# Patient Record
Sex: FEMALE | Race: BLACK OR AFRICAN AMERICAN | Employment: OTHER | URBAN - METROPOLITAN AREA
[De-identification: names, ages, dates, MRNs, and addresses within clinical notes are randomized per-mention and may not be internally consistent; named-entity substitution may affect disease eponyms.]

---

## 2022-01-16 ENCOUNTER — HOSPITAL ENCOUNTER (EMERGENCY)
Facility: HOSPITAL | Age: 37
Discharge: HOME | End: 2022-01-16
Attending: EMERGENCY MEDICINE
Payer: COMMERCIAL

## 2022-01-16 VITALS
TEMPERATURE: 99.2 F | OXYGEN SATURATION: 99 % | HEIGHT: 63 IN | RESPIRATION RATE: 20 BRPM | DIASTOLIC BLOOD PRESSURE: 80 MMHG | HEART RATE: 101 BPM | SYSTOLIC BLOOD PRESSURE: 148 MMHG | WEIGHT: 125 LBS | BODY MASS INDEX: 22.15 KG/M2

## 2022-01-16 DIAGNOSIS — R30.0 DYSURIA: ICD-10-CM

## 2022-01-16 DIAGNOSIS — Z20.822 PERSON UNDER INVESTIGATION FOR COVID-19: Primary | ICD-10-CM

## 2022-01-16 LAB
BILIRUB UR QL STRIP: NEGATIVE
CLARITY UR: ABNORMAL
COLOR UR: YELLOW
GLUCOSE UR STRIP-MCNC: NEGATIVE MG/DL
HGB UR QL STRIP.AUTO: NEGATIVE
KETONES UR STRIP-MCNC: ABNORMAL MG/DL
LEUKOCYTE ESTERASE UR QL STRIP: NEGATIVE
NITRITE UR QL STRIP: NEGATIVE
PH UR STRIP.AUTO: 6 [PH]
PROT UR STRIP-MCNC: NEGATIVE MG/DL
SP GR UR STRIP.AUTO: >=1.03 (ref 1–1.03)
UROBILINOGEN UR QL STRIP.AUTO: 0.2 E.U./DL

## 2022-01-16 PROCEDURE — 81003 URINALYSIS AUTO W/O SCOPE: CPT | Performed by: PHYSICIAN ASSISTANT

## 2022-01-16 PROCEDURE — 99284 EMERGENCY DEPT VISIT MOD MDM: CPT | Performed by: PHYSICIAN ASSISTANT

## 2022-01-16 PROCEDURE — 99283 EMERGENCY DEPT VISIT LOW MDM: CPT

## 2022-01-16 PROCEDURE — 87636 SARSCOV2 & INF A&B AMP PRB: CPT | Performed by: PHYSICIAN ASSISTANT

## 2022-01-16 RX ORDER — ACETAMINOPHEN 325 MG/1
975 TABLET ORAL ONCE
Status: DISCONTINUED | OUTPATIENT
Start: 2022-01-16 | End: 2022-01-16 | Stop reason: HOSPADM

## 2022-01-16 RX ORDER — BUDESONIDE 180 UG/1
2 AEROSOL, POWDER RESPIRATORY (INHALATION) 2 TIMES DAILY
Qty: 1 EACH | Refills: 0 | Status: SHIPPED | OUTPATIENT
Start: 2022-01-16

## 2022-01-16 RX ORDER — ACETAMINOPHEN 500 MG
1000 TABLET ORAL EVERY 6 HOURS PRN
Qty: 60 TABLET | Refills: 0 | Status: SHIPPED | OUTPATIENT
Start: 2022-01-16

## 2022-01-18 LAB
FLUAV RNA RESP QL NAA+PROBE: NEGATIVE
FLUBV RNA RESP QL NAA+PROBE: NEGATIVE
SARS-COV-2 RNA RESP QL NAA+PROBE: POSITIVE

## 2022-01-18 NOTE — RESULT ENCOUNTER NOTE
I spoke with Jaclyn Esteban and let her know that her COVID-19 swab was positive  Continue symptomatic treatment  Advised she implement home isolation measures including      Staying home  Stay in a specific "sick room" or area and away from other people or animals, including pets  Wear a mask when leaving your room  Use a separate bathroom, if available  Wipe down all commonly touched surfaces with household

## 2022-01-18 NOTE — RESULT ENCOUNTER NOTE
Attempted to contact patient regarding +COVID results -pt unable to talk at the time -provided [facility] ED number to callback for results

## 2022-01-19 NOTE — ED PROVIDER NOTES
History  Chief Complaint   Patient presents with    Chills     bodyaches, headache     40-year-old female presents to the emergency department seeking evaluation for generalized body aches pains headache subjective fevers chills and viral URI type symptoms  Patient reportedly lives in a house with several other housemates  She is accompanied by 2 other housemates who share similar symptoms  This patient also complains of dysuria associated with her generalized body aches and pains  Overall she is well-appearing in no acute distress  She is unvaccinated for COVID  Allergies reviewed          None       History reviewed  No pertinent past medical history  History reviewed  No pertinent surgical history  History reviewed  No pertinent family history  I have reviewed and agree with the history as documented  E-Cigarette/Vaping     E-Cigarette/Vaping Substances     Social History     Tobacco Use    Smoking status: Heavy Tobacco Smoker    Smokeless tobacco: Former User   Substance Use Topics    Alcohol use: Yes    Drug use: Yes     Types: Marijuana       Review of Systems   Constitutional: Positive for chills, fatigue and fever  HENT: Negative for congestion, ear pain, rhinorrhea, sinus pressure, sneezing, sore throat and trouble swallowing  Eyes: Negative for discharge and itching  Respiratory: Negative for cough, chest tightness, shortness of breath, wheezing and stridor  Cardiovascular: Negative for chest pain and palpitations  Gastrointestinal: Negative for abdominal pain, diarrhea, nausea and vomiting  Genitourinary: Positive for dysuria  Negative for vaginal discharge  Musculoskeletal: Positive for myalgias  Neurological: Negative for dizziness, syncope, numbness and headaches  All other systems reviewed and are negative  Physical Exam  Physical Exam  Vitals and nursing note reviewed  Constitutional:       General: She is not in acute distress       Appearance: Normal appearance  She is well-developed  She is not ill-appearing or diaphoretic  HENT:      Head: Normocephalic and atraumatic  Right Ear: External ear normal       Left Ear: External ear normal       Nose: Nose normal    Eyes:      Conjunctiva/sclera: Conjunctivae normal       Pupils: Pupils are equal, round, and reactive to light  Cardiovascular:      Rate and Rhythm: Normal rate and regular rhythm  Heart sounds: Normal heart sounds  No murmur heard  No friction rub  No gallop  Pulmonary:      Effort: Pulmonary effort is normal  No respiratory distress  Breath sounds: Normal breath sounds  No stridor  No wheezing or rales  Abdominal:      General: Bowel sounds are normal  There is no distension  Palpations: Abdomen is soft  Tenderness: There is no abdominal tenderness  There is no guarding  Musculoskeletal:         General: No tenderness  Normal range of motion  Cervical back: Normal range of motion  Skin:     General: Skin is warm  Capillary Refill: Capillary refill takes less than 2 seconds  Neurological:      Mental Status: She is alert and oriented to person, place, and time           Vital Signs  ED Triage Vitals [01/16/22 1443]   Temperature Pulse Respirations Blood Pressure SpO2   99 2 °F (37 3 °C) 101 20 148/80 99 %      Temp Source Heart Rate Source Patient Position - Orthostatic VS BP Location FiO2 (%)   Tympanic Monitor -- Left arm --      Pain Score       10 - Worst Possible Pain           Vitals:    01/16/22 1443   BP: 148/80   Pulse: 101         Visual Acuity      ED Medications  Medications - No data to display    Diagnostic Studies  Results Reviewed     Procedure Component Value Units Date/Time    COVID/FLU - 24 hour TAT [[id number]]  (Abnormal) Collected: 01/16/22 1813    Lab Status: Final result Specimen: Nares from Nose Updated: 01/18/22 0720     SARS-CoV-2 Positive     INFLUENZA A PCR Negative     INFLUENZA B PCR Negative    Narrative:      FOR PEDIATRIC PATIENTS - copy/paste COVID Guidelines URL to browser: [email or web address]    SARS-CoV-2 assay is a Nucleic Acid Amplification assay intended for the  qualitative detection of nucleic acid from SARS-CoV-2 in nasopharyngeal  swabs  Results are for the presumptive identification of SARS-CoV-2 RNA  Positive results are indicative of infection with SARS-CoV-2, the virus  causing COVID-19, but do not rule out bacterial infection or co-infection  with other viruses  Laboratories within the [place] and its  territories are required to report all positive results to the appropriate  public health authorities  Negative results do not preclude SARS-CoV-2  infection and should not be used as the sole basis for treatment or other  patient management decisions  Negative results must be combined with  clinical observations, patient history, and epidemiological information  This test has not been FDA cleared or approved  This test has been authorized by FDA under an Emergency Use Authorization  (EUA)  This test is only authorized for the duration of time the  declaration that circumstances exist justifying the authorization of the  emergency use of an in vitro diagnostic tests for detection of SARS-CoV-2  virus and/or diagnosis of COVID-19 infection under section 564(b)(1) of  the Act, 21 U  S C  190KAH-7(K)(5), unless the authorization is terminated  or revoked sooner  The test has been validated but independent review by FDA  and CLIA is pending  Test performed using the Roche andrew 6800 System: This RT-PCR assay  targets ORF1, a region unique to SARS-CoV-2   A conserved region in the  E-gene was chosen for pan-Sarbecovirus detection which includes  SARS-CoV-2       UA (URINE) with reflex to Scope [[id number]]  (Abnormal) Collected: 01/16/22 1811    Lab Status: Final result Specimen: Urine, Clean Catch Updated: 01/16/22 1911     Color, UA Yellow Clarity, UA Slightly Cloudy     Specific Gravity, UA >=1 030     pH, UA 6 0     Leukocytes, UA Negative     Nitrite, UA Negative     Protein, UA Negative mg/dl      Glucose, UA Negative mg/dl      Ketones, UA 15 (1+) mg/dl      Urobilinogen, UA 0 2 E U /dl      Bilirubin, UA Negative     Blood, UA Negative                 No orders to display              Procedures  Procedures         ED Course                                             MDM  Number of Diagnoses or Management Options  Dysuria  Person under investigation for COVID-19  Diagnosis management comments: Generally benign physical examination  Symptoms concerning for COVID-19  Recommend supportive care at home  Patient educated regarding COVID-23  Patient educated regarding their diagnosis and given return and follow-up instructions  Patient was advised to returned to the ED with worsening symptoms or concerns  Patient is understanding of and in agreement with the treatment plan  There are no questions at the time of discharge  Amount and/or Complexity of Data Reviewed  Clinical lab tests: ordered    Risk of Complications, Morbidity, and/or Mortality  Presenting problems: low  Diagnostic procedures: low  Management options: low    Patient Progress  Patient progress: stable      Disposition  Final diagnoses:   Person under investigation for COVID-19   Dysuria     Time reflects when diagnosis was documented in both MDM as applicable and the Disposition within this note     Time User Action Codes Description Comment    1/16/2022  3:58 PM London Guillermo Add [Z20 822] Person under investigation for COVID-19     1/16/2022  3:58 PM London Mccanner Add [R30 0] Dysuria       ED Disposition     ED Disposition Condition Date/Time Comment    Discharge Stable Sun Jan 16, 2022  3:58 PM Lennox Lozoya discharge to home/self care              Follow-up Information    None         Discharge Medication List as of 1/16/2022  7:33 PM      START taking these medications    Details   acetaminophen (TYLENOL) 500 mg tablet Take 2 tablets (1,000 mg total) by mouth every 6 (six) hours as needed for mild pain, Starting Sun 1/16/2022, Print      budesonide (Pulmicort Flexhaler) 180 MCG/ACT inhaler Inhale 2 puffs 2 (two) times a day Rinse mouth after use , Starting Sun 1/16/2022, Print             No discharge procedures on file      PDMP Review     None          ED Provider  Electronically Signed by           Elba Alves PA-C  01/19/22 0126

## 2023-04-04 ENCOUNTER — APPOINTMENT (EMERGENCY)
Dept: CT IMAGING | Facility: HOSPITAL | Age: 38
End: 2023-04-04

## 2023-04-04 ENCOUNTER — HOSPITAL ENCOUNTER (EMERGENCY)
Facility: HOSPITAL | Age: 38
Discharge: HOME | End: 2023-04-04
Attending: EMERGENCY MEDICINE

## 2023-04-04 VITALS
OXYGEN SATURATION: 99 % | HEART RATE: 75 BPM | DIASTOLIC BLOOD PRESSURE: 56 MMHG | RESPIRATION RATE: 16 BRPM | BODY MASS INDEX: 22.32 KG/M2 | TEMPERATURE: 98.7 F | HEIGHT: 63 IN | SYSTOLIC BLOOD PRESSURE: 110 MMHG | WEIGHT: 126 LBS

## 2023-04-04 DIAGNOSIS — K60.2 ANAL FISSURE: ICD-10-CM

## 2023-04-04 DIAGNOSIS — D64.9 ANEMIA: ICD-10-CM

## 2023-04-04 DIAGNOSIS — K62.89 RECTAL PAIN: Primary | ICD-10-CM

## 2023-04-04 DIAGNOSIS — D25.9 UTERINE MYOMA: ICD-10-CM

## 2023-04-04 LAB
ALBUMIN SERPL BCP-MCNC: 3.7 G/DL (ref 3.5–5)
ALP SERPL-CCNC: 48 U/L (ref 34–104)
ALT SERPL W P-5'-P-CCNC: 14 U/L (ref 7–52)
ANION GAP SERPL CALCULATED.3IONS-SCNC: 10 MMOL/L (ref 4–13)
AST SERPL W P-5'-P-CCNC: 46 U/L (ref 13–39)
BASOPHILS # BLD AUTO: 0.03 THOUSANDS/ÂΜL (ref 0–0.1)
BASOPHILS NFR BLD AUTO: 0 % (ref 0–1)
BILIRUB SERPL-MCNC: 0.32 MG/DL (ref 0.2–1)
BILIRUB UR QL STRIP: NEGATIVE
BUN SERPL-MCNC: 8 MG/DL (ref 5–25)
CALCIUM SERPL-MCNC: 8.3 MG/DL (ref 8.4–10.2)
CHLORIDE SERPL-SCNC: 104 MMOL/L (ref 96–108)
CLARITY UR: CLEAR
CO2 SERPL-SCNC: 23 MMOL/L (ref 21–32)
COLOR UR: ABNORMAL
CREAT SERPL-MCNC: 0.61 MG/DL (ref 0.6–1.3)
EOSINOPHIL # BLD AUTO: 0.06 THOUSAND/ÂΜL (ref 0–0.61)
EOSINOPHIL NFR BLD AUTO: 1 % (ref 0–6)
ERYTHROCYTE [DISTWIDTH] IN BLOOD BY AUTOMATED COUNT: 18.7 % (ref 11.6–15.1)
EXT PREGNANCY TEST URINE: NEGATIVE
EXT. CONTROL: NORMAL
GFR SERPL CREATININE-BSD FRML MDRD: 116 ML/MIN/1.73SQ M
GLUCOSE SERPL-MCNC: 80 MG/DL (ref 65–140)
GLUCOSE UR STRIP-MCNC: NEGATIVE MG/DL
HCT VFR BLD AUTO: 30.1 % (ref 34.8–46.1)
HGB BLD-MCNC: 9.7 G/DL (ref 11.5–15.4)
HGB UR QL STRIP.AUTO: NEGATIVE
IMM GRANULOCYTES # BLD AUTO: 0.02 THOUSAND/UL (ref 0–0.2)
IMM GRANULOCYTES NFR BLD AUTO: 0 % (ref 0–2)
KETONES UR STRIP-MCNC: NEGATIVE MG/DL
LEUKOCYTE ESTERASE UR QL STRIP: NEGATIVE
LIPASE SERPL-CCNC: 9 U/L (ref 11–82)
LYMPHOCYTES # BLD AUTO: 2.94 THOUSANDS/ÂΜL (ref 0.6–4.47)
LYMPHOCYTES NFR BLD AUTO: 35 % (ref 14–44)
MAGNESIUM SERPL-MCNC: 2 MG/DL (ref 1.9–2.7)
MCH RBC QN AUTO: 31.6 PG (ref 26.8–34.3)
MCHC RBC AUTO-ENTMCNC: 32.2 G/DL (ref 31.4–37.4)
MCV RBC AUTO: 98 FL (ref 82–98)
MONOCYTES # BLD AUTO: 0.52 THOUSAND/ÂΜL (ref 0.17–1.22)
MONOCYTES NFR BLD AUTO: 6 % (ref 4–12)
NEUTROPHILS # BLD AUTO: 4.92 THOUSANDS/ÂΜL (ref 1.85–7.62)
NEUTS SEG NFR BLD AUTO: 58 % (ref 43–75)
NITRITE UR QL STRIP: NEGATIVE
NRBC BLD AUTO-RTO: 0 /100 WBCS
PH UR STRIP.AUTO: 6 [PH]
PLATELET # BLD AUTO: 360 THOUSANDS/UL (ref 149–390)
PMV BLD AUTO: 9.3 FL (ref 8.9–12.7)
POTASSIUM SERPL-SCNC: 4.9 MMOL/L (ref 3.5–5.3)
PROT SERPL-MCNC: 7.6 G/DL (ref 6.4–8.4)
PROT UR STRIP-MCNC: NEGATIVE MG/DL
RBC # BLD AUTO: 3.07 MILLION/UL (ref 3.81–5.12)
SODIUM SERPL-SCNC: 137 MMOL/L (ref 135–147)
SP GR UR STRIP.AUTO: <=1.005
UROBILINOGEN UR QL STRIP.AUTO: 0.2 E.U./DL
WBC # BLD AUTO: 8.49 THOUSAND/UL (ref 4.31–10.16)

## 2023-04-04 RX ORDER — OXYCODONE HYDROCHLORIDE AND ACETAMINOPHEN 5; 325 MG/1; MG/1
1 TABLET ORAL EVERY 4 HOURS PRN
Qty: 12 TABLET | Refills: 0 | Status: SHIPPED | OUTPATIENT
Start: 2023-04-04 | End: 2023-04-14

## 2023-04-04 RX ORDER — MORPHINE SULFATE 4 MG/ML
4 INJECTION, SOLUTION INTRAMUSCULAR; INTRAVENOUS ONCE
Status: COMPLETED | OUTPATIENT
Start: 2023-04-04 | End: 2023-04-04

## 2023-04-04 RX ORDER — POLYETHYLENE GLYCOL 3350 17 G/17G
17 POWDER, FOR SOLUTION ORAL DAILY
Qty: 12 EACH | Refills: 0 | Status: SHIPPED | OUTPATIENT
Start: 2023-04-04

## 2023-04-04 RX ADMIN — SODIUM CHLORIDE 1000 ML: 0.9 INJECTION, SOLUTION INTRAVENOUS at 11:31

## 2023-04-04 RX ADMIN — IOHEXOL 100 ML: 350 INJECTION, SOLUTION INTRAVENOUS at 12:51

## 2023-04-04 RX ADMIN — MORPHINE SULFATE 4 MG: 4 INJECTION, SOLUTION INTRAMUSCULAR; INTRAVENOUS at 12:37

## 2023-04-04 RX ADMIN — MORPHINE SULFATE 4 MG: 4 INJECTION INTRAVENOUS at 13:40

## 2023-04-04 NOTE — CONSULTS
"Consultation - General Surgery   Calixto Mcnally 40 y o  female MRN: [id number]  Unit/Bed#: ED 17 Encounter: [id number]    Assessment:  59-year-old female with no significant past medical history presenting with rectal pain  -AVSS on room air  -WBC 8 49  -Hgb 9 7  -CR 0 61  -Plts 360  -Electrolytes unremarkable  -CTAP 2023 with \"No CT evidence for appendicitis or perirectal abscess  Bulky myomatous uterus  Mild urinary bladder wall thickening which is likely artifactual from incomplete distention  \"    Plan:  No surgical intervention indicated at this time  Patient likely with anal fissure   Recommend discharge with narcotic pain medication  Recommend high-fiber diet and newly MiraLAX  Would benefit from sitz bath's  Evaluated at bedside with attending    History of Present Illness   Chief Complaint: Rectal pain    HPI:  Calixto Mcnally is a 40 y o  female with no significant past medical history who initially presented to [facility] ED on 2023 with complaints of history of rectal pain  Reports acute onset of rectal pain with radiation to lower abdomen  Pain is exacerbated with bowel movements  Patient does report 1 episode of bright red/dark red bloody bowel movement at onset of rectal pain  Denies additional episodes  Patient reports that she has been using over-the-counter hemorrhoid treatment and Tylenol for pain however has not had relief  Otherwise denies nausea, vomiting, diarrhea, constipation  Denies urinary symptoms, dysuria, urgency, frequency  Denies chest pain, palpitations, shortness of breath  Past surgical history significant for 3  sections  Denies reaction to anesthesia  Review of Systems   Constitutional: Negative for activity change, appetite change, chills and fever  HENT: Negative  Respiratory: Negative for cough and shortness of breath  Cardiovascular: Negative for chest pain, palpitations and leg swelling     Gastrointestinal: Positive for abdominal pain and " "blood in stool  Negative for abdominal distention, constipation, diarrhea, nausea and vomiting  Endocrine: Negative for polydipsia, polyphagia and polyuria  Genitourinary: Negative for difficulty urinating, dysuria, flank pain, frequency and urgency  Musculoskeletal: Negative for arthralgias and myalgias  Skin: Negative for color change, rash and wound  Neurological: Negative for dizziness, weakness, light-headedness, numbness and headaches  Historical Information   History reviewed  No pertinent past medical history  History reviewed  No pertinent surgical history  Social History   Social History     Substance and Sexual Activity   Alcohol Use Yes    Comment: social     Social History     Substance and Sexual Activity   Drug Use Yes   • Types: Marijuana     E-Cigarette/Vaping   • E-Cigarette Use Never User      E-Cigarette/Vaping Substances     Social History     Tobacco Use   Smoking Status Heavy Smoker   • Packs/day: 2 00   • Types: Cigarettes   Smokeless Tobacco Former     Family History: History reviewed  No pertinent family history      Meds/Allergies   all current active meds have been reviewed  Allergies   Allergen Reactions   • Methotrexate Abdominal Pain   • Motrin [Ibuprofen] Abdominal Pain   • Tramadol Abdominal Pain       Objective   First Vitals:   Blood Pressure: 123/85 (04/04/23 1119)  Pulse: 79 (04/04/23 1119)  Temperature: 98 7 °F (37 1 °C) (04/04/23 1119)  Temp Source: Oral (04/04/23 1119)  Respirations: 19 (04/04/23 1119)  Height: 5' 3\" (160 cm) (04/04/23 1119)  Weight - Scale: 57 2 kg (126 lb) (04/04/23 1119)  SpO2: 98 % (04/04/23 1119)    Current Vitals:   Blood Pressure: 118/66 (04/04/23 1430)  Pulse: 88 (04/04/23 1430)  Temperature: 98 7 °F (37 1 °C) (04/04/23 1119)  Temp Source: Oral (04/04/23 1119)  Respirations: 16 (04/04/23 1430)  Height: 5' 3\" (160 cm) (04/04/23 1119)  Weight - Scale: 57 2 kg (126 lb) (04/04/23 1119)  SpO2: 96 % (04/04/23 1430)    No intake or output " data in the 24 hours ending 04/04/23 1455    Invasive Devices     Peripheral Intravenous Line  Duration           Peripheral IV 04/04/23 Left Antecubital <1 day                Physical Exam  Vitals and nursing note reviewed  Constitutional:       General: She is in acute distress (Secondary to pain)  Appearance: Normal appearance  She is normal weight  She is not ill-appearing or toxic-appearing  HENT:      Head: Normocephalic and atraumatic  Cardiovascular:      Rate and Rhythm: Normal rate and regular rhythm  Pulses: Normal pulses  Heart sounds: Normal heart sounds  Pulmonary:      Effort: Pulmonary effort is normal       Breath sounds: Normal breath sounds  Abdominal:      General: Bowel sounds are normal  There is no distension  Palpations: Abdomen is soft  Tenderness: There is abdominal tenderness  There is no guarding or rebound  Genitourinary:     Rectum: Normal       Comments: External rectal examination with no obvious surrounding erythema, edema, fluctuance; internal rectal examination with no obvious fissure, sentinel tag, hemorrhoids; no blood noted on internal exam  Musculoskeletal:         General: Normal range of motion  Right lower leg: No edema  Left lower leg: No edema  Skin:     General: Skin is warm and dry  Capillary Refill: Capillary refill takes less than 2 seconds  Neurological:      General: No focal deficit present  Mental Status: She is alert and oriented to person, place, and time  Psychiatric:         Mood and Affect: Mood normal          Behavior: Behavior normal          Thought Content: Thought content normal        Lab Results:   I have personally reviewed pertinent lab results      CBC:   Lab Results   Component Value Date    WBC 8 49 04/04/2023    HGB 9 7 (L) 04/04/2023    HCT 30 1 (L) 04/04/2023    MCV 98 04/04/2023     04/04/2023    MCH 31 6 04/04/2023    MCHC 32 2 04/04/2023    RDW 18 7 (H) 04/04/2023    MPV 9 3 04/04/2023    NRBC 0 04/04/2023     CMP:   Lab Results   Component Value Date    SODIUM 137 04/04/2023    K 4 9 04/04/2023     04/04/2023    CO2 23 04/04/2023    BUN 8 04/04/2023    CREATININE 0 61 04/04/2023    CALCIUM 8 3 (L) 04/04/2023    AST 46 (H) 04/04/2023    ALT 14 04/04/2023    ALKPHOS 48 04/04/2023    EGFR 116 04/04/2023     Urinalysis:   Lab Results   Component Value Date    COLORU Straw 04/04/2023    CLARITYU Clear 04/04/2023    SPECGRAV <=1 005 (L) 04/04/2023    PHUR 6 0 04/04/2023    LEUKOCYTESUR Negative 04/04/2023    NITRITE Negative 04/04/2023    GLUCOSEU Negative 04/04/2023    KETONESU Negative 04/04/2023    BILIRUBINUR Negative 04/04/2023    BLOODU Negative 04/04/2023     Lipase:   Lab Results   Component Value Date    LIPASE 9 (L) 04/04/2023     Imaging: I have personally reviewed pertinent reports  EKG, Pathology, and Other Studies: I have personally reviewed pertinent reports  Code Status: No Order  Advance Directive and Living Will:      Power of :    POLST:      Counseling / Coordination of Care  Total floor / unit time spent today 25 minutes  Greater than 50% of total time was spent with the patient and / or family counseling and / or coordination of care  A description of the counseling / coordination of care: Reviewing pertinent diagnostic imaging and laboratory studies, evaluation patient, discussion with attending       Jayme Carroll PA-C

## 2023-04-04 NOTE — ED PROVIDER NOTES
"History  Chief Complaint   Patient presents with   • Rectal Pain     Patient co \"burning, soreness, and pain in rectum\" that started a few days ago  Patient reports using OTC preparation H with no relief  Pt is a 27-year-old female with a past medical history of rheumatoid there is arriving for evaluation of rectal pain  Patient states that her rectal pain started a few days ago  Patient reports burning with urination however, when she urinates  Patient reports that she has had dark-colored stool  Patient reports that pain is internal   No external hemorrhoids, or abscesses noted  Patient is changing her walk due to the amount of pain that she is in  Patient also reporting right lower quadrant pain, worse with bowel movement  Patient reports history of 3 C-sections  Patient denies any fevers or chills  Patient reports that she tried Preparation H with no relief for pain  Denies any chest pain or shortness of breath  Prior to Admission Medications   Prescriptions Last Dose Informant Patient Reported? Taking?   acetaminophen (TYLENOL) 500 mg tablet   No Yes   Sig: Take 2 tablets (1,000 mg total) by mouth every 6 (six) hours as needed for mild pain   budesonide (Pulmicort Flexhaler) 180 MCG/ACT inhaler   No Yes   Sig: Inhale 2 puffs 2 (two) times a day Rinse mouth after use  Facility-Administered Medications: None       History reviewed  No pertinent past medical history  History reviewed  No pertinent surgical history  History reviewed  No pertinent family history  I have reviewed and agree with the history as documented      E-Cigarette/Vaping   • E-Cigarette Use Never User      E-Cigarette/Vaping Substances     Social History     Tobacco Use   • Smoking status: Heavy Smoker     Packs/day: 2 00     Types: Cigarettes   • Smokeless tobacco: Former   Vaping Use   • Vaping Use: Never used   Substance Use Topics   • Alcohol use: Yes     Comment: social   • Drug use: Yes     Types: " Marijuana       Review of Systems   Constitutional: Negative  HENT: Negative  Eyes: Negative  Respiratory: Negative  Cardiovascular: Negative  Gastrointestinal: Positive for abdominal pain and blood in stool  Negative for abdominal distention, anal bleeding, constipation, diarrhea, nausea and rectal pain  Endocrine: Negative  Genitourinary: Negative  Musculoskeletal: Negative  Skin: Negative  Allergic/Immunologic: Negative  Neurological: Negative  Hematological: Negative  Psychiatric/Behavioral: Negative  All other systems reviewed and are negative  Physical Exam  Physical Exam  Vitals and nursing note reviewed  Constitutional:       General: She is not in acute distress  Appearance: Normal appearance  She is normal weight  She is ill-appearing  She is not toxic-appearing  HENT:      Head: Normocephalic  Right Ear: External ear normal       Left Ear: External ear normal       Nose: Nose normal       Mouth/Throat:      Pharynx: Oropharynx is clear  Eyes:      General:         Right eye: No discharge  Left eye: No discharge  Extraocular Movements: Extraocular movements intact  Conjunctiva/sclera: Conjunctivae normal       Pupils: Pupils are equal, round, and reactive to light  Cardiovascular:      Rate and Rhythm: Normal rate and regular rhythm  Pulses: Normal pulses  Heart sounds: Normal heart sounds  Pulmonary:      Effort: Pulmonary effort is normal  No respiratory distress  Breath sounds: Normal breath sounds  Abdominal:      General: Abdomen is flat  Bowel sounds are normal  There is no distension  Palpations: Abdomen is soft  There is no mass  Tenderness: There is abdominal tenderness  There is no right CVA tenderness, left CVA tenderness, guarding or rebound  Hernia: No hernia is present     Genitourinary:     Comments: No blood at rectum, no abscess, no hemorrhoid  Musculoskeletal:         General: Normal range of motion  Cervical back: Normal range of motion and neck supple  Skin:     General: Skin is warm  Capillary Refill: Capillary refill takes less than 2 seconds  Neurological:      General: No focal deficit present  Mental Status: She is alert and oriented to person, place, and time  Mental status is at baseline  Psychiatric:         Mood and Affect: Mood normal          Thought Content:  Thought content normal          Vital Signs  ED Triage Vitals [04/04/23 1119]   Temperature Pulse Respirations Blood Pressure SpO2   98 7 °F (37 1 °C) 79 19 123/85 98 %      Temp Source Heart Rate Source Patient Position - Orthostatic VS BP Location FiO2 (%)   Oral Monitor Sitting Left arm --      Pain Score       10 - Worst Possible Pain           Vitals:    04/04/23 1300 04/04/23 1330 04/04/23 1430 04/04/23 1500   BP: 112/90 113/80 118/66 110/56   Pulse: 83 71 88 75   Patient Position - Orthostatic VS: Lying Lying Lying          Visual Acuity      ED Medications  Medications   sodium chloride 0 9 % bolus 1,000 mL (0 mL Intravenous Stopped 4/4/23 1506)   morphine injection 4 mg (4 mg Intravenous Given 4/4/23 1237)   iohexol (OMNIPAQUE) 350 MG/ML injection (SINGLE-DOSE) 100 mL (100 mL Intravenous Given 4/4/23 1251)   morphine injection 4 mg (4 mg Intravenous Given 4/4/23 1340)       Diagnostic Studies  Results Reviewed     Procedure Component Value Units Date/Time    UA w Reflex to Microscopic w Reflex to Culture [[id number]]  (Abnormal) Collected: 04/04/23 1205    Lab Status: Final result Specimen: Urine, Clean Catch Updated: 04/04/23 1215     Color, UA Straw     Clarity, UA Clear     Specific Gravity, UA <=1 005     pH, UA 6 0     Leukocytes, UA Negative     Nitrite, UA Negative     Protein, UA Negative mg/dl      Glucose, UA Negative mg/dl      Ketones, UA Negative mg/dl      Urobilinogen, UA 0 2 E U /dl      Bilirubin, UA Negative     Occult Blood, UA Negative    CMP [[id number]]  (Abnormal) Collected: 04/04/23 1135    Lab Status: Final result Specimen: Blood from Arm, Left Updated: 04/04/23 1209     Sodium 137 mmol/L      Potassium 4 9 mmol/L      Chloride 104 mmol/L      CO2 23 mmol/L      ANION GAP 10 mmol/L      BUN 8 mg/dL      Creatinine 0 61 mg/dL      Glucose 80 mg/dL      Calcium 8 3 mg/dL      AST 46 U/L      ALT 14 U/L      Alkaline Phosphatase 48 U/L      Total Protein 7 6 g/dL      Albumin 3 7 g/dL      Total Bilirubin 0 32 mg/dL      eGFR 116 ml/min/1 73sq m     Narrative:      [place] guidelines for Chronic Kidney Disease (CKD):   •  Stage 1 with normal or high GFR (GFR > 90 mL/min/1 73 square meters)  •  Stage 2 Mild CKD (GFR = 60-89 mL/min/1 73 square meters)  •  Stage 3A Moderate CKD (GFR = 45-59 mL/min/1 73 square meters)  •  Stage 3B Moderate CKD (GFR = 30-44 mL/min/1 73 square meters)  •  Stage 4 Severe CKD (GFR = 15-29 mL/min/1 73 square meters)  •  Stage 5 End Stage CKD (GFR <15 mL/min/1 73 square meters)  Note: GFR calculation is accurate only with a steady state creatinine    Lipase [[id number]]  (Abnormal) Collected: 04/04/23 1135    Lab Status: Final result Specimen: Blood from Arm, Left Updated: 04/04/23 1209     Lipase 9 u/L     Magnesium [[id number]]  (Normal) Collected: 04/04/23 1135    Lab Status: Final result Specimen: Blood from Arm, Left Updated: 04/04/23 1209     Magnesium 2 0 mg/dL     POCT pregnancy, urine [[id number]]  (Normal) Resulted: 04/04/23 1207    Lab Status: Final result Specimen: Urine Updated: 04/04/23 1207     EXT Preg Test, Ur Negative     Control Valid    CBC and differential [[id number]]  (Abnormal) Collected: 04/04/23 1135    Lab Status: Final result Specimen: Blood from Arm, Left Updated: 04/04/23 1140     WBC 8 49 Thousand/uL      RBC 3 07 Million/uL      Hemoglobin 9 7 g/dL      Hematocrit 30 1 %      MCV 98 fL      MCH 31 6 pg      MCHC 32 2 g/dL      RDW 18 7 %      MPV 9 3 fL      Platelets 189 Thousands/uL      nRBC 0 /100 WBCs      Neutrophils Relative 58 %      Immat GRANS % 0 %      Lymphocytes Relative 35 %      Monocytes Relative 6 %      Eosinophils Relative 1 %      Basophils Relative 0 %      Neutrophils Absolute 4 92 Thousands/µL      Immature Grans Absolute 0 02 Thousand/uL      Lymphocytes Absolute 2 94 Thousands/µL      Monocytes Absolute 0 52 Thousand/µL      Eosinophils Absolute 0 06 Thousand/µL      Basophils Absolute 0 03 Thousands/µL                  CT Abdomen pelvis with contrast   Final Result by Adonay Simon DO (04/04 1433)   No CT evidence for appendicitis or perirectal abscess  Bulky myomatous uterus  Mild urinary bladder wall thickening which is likely artifactual from incomplete distention         Workstation performed: [id number]                    Procedures  Procedures         ED Course  ED Course as of 04/04/23 2003 Tue Apr 04, 2023   1322 Re-evaluated patient's pain after morphine, reports improved but still an 8/10  Will provide additional pain medication  1444 Per CT study no acute explanation for patient's rectal pain  External exam is without abnormality except for internal tenderness  Discussed case with General Surgery AMY and will evaluate patient  Medical Decision Making  DDx: perirectal abscess, appendicitis   Arriving to the emergency department stating that she has had 3 days of rectal pain  Patient reports the pain is worse during bowel movement  Patient reports that she has noted dark stool  Patient has no external hemorrhoids, abscess on exam, no rash or vesicles noted  Patient reports tenderness is on the anterior of her rectum  Patient denies any trauma to her rectum  Patient has no abscess on labia  Will rule out perirectal abscess, in addition patient reports right lower quadrant pain as well for three days and is tender in RLQ  Patient has no leukocytosis  Patient has anemia of 9 7    Patient has no prior "records to review  Patient has no electrolyte abnormalities, no SARAHI  Patient's urine shows no indication of UTI  Patient denies current menses  Pregnancy ruled out with negative pregnancy test    Patient has no sign of perirectal abscess or appendicitis  Patient continues to have a rectal pain  Discussed case with general surgery joshua mascorro who reports evaluated pt and reports, \"Think it's an anal fissure and she wants to go home so recommending narcotic pain meds on discharge, high fiber diet, and Miralax  \" Patient prescribed miralax and percocet  Patient provided information with high fiber diet  Patient does not have a pcp, a referral was placed for one  Referral to OB placed for myomatus uterus, attempted to discuss with patient after discharge for incidental finding but patient did not answer phone and unable to leave a message  Aware to follow up with PCP for anemia  Patient's pain is controlled  Patient aware of follow up plan  Aware of return precautions  Reviewed reasons to return to ed  Patient verbalized understanding of diagnosis and agreement with discharge plan of care as well as understanding of reasons to return to ed  Amount and/or Complexity of Data Reviewed  Labs: ordered  Radiology: ordered  Risk  Prescription drug management            Disposition  Final diagnoses:   Rectal pain   Anal fissure   Anemia   Uterine myoma     Time reflects when diagnosis was documented in both MDM as applicable and the Disposition within this note     Time User Action Codes Description Comment    4/4/2023 12:55 PM Isidro Score Add [K62 89] Rectal pain     4/4/2023  3:18 PM Isidro Score Add [K60 2] Anal fissure     4/4/2023  3:29 PM Isidro Score Add [D64 9] Anemia     4/4/2023  7:59 PM Isidro Score Add [D25 9] Uterine myoma       ED Disposition     ED Disposition   Discharge    Condition   Stable    Date/Time   Tue Apr 4, 2023  3:18 PM    Comment   Carly Tijerina discharge " to home/self care                 Follow-up Information     Follow up With Specialties Details Why Contact Info Additional Information    Ann Pineda MD General Surgery Schedule an appointment as soon as possible for a visit in 1 day For further evaluation of symptoms [address] Emergency Department Emergency Medicine Go to  If symptoms worsen [address] [place] [address]  [facility] Emergency Department, [address] [facility] [address], [address] [facility], [address] [facility]          Discharge Medication List as of 4/4/2023  3:31 PM      START taking these medications    Details   oxyCODONE-acetaminophen (Percocet) 5-325 mg per tablet Take 1 tablet by mouth every 4 (four) hours as needed for moderate pain for up to 10 days Max Daily Amount: 6 tablets, Starting Tue 4/4/2023, Until Fri 4/14/2023 at 2359, Normal      polyethylene glycol (MIRALAX) 17 g packet Take 17 g by mouth daily, Starting Tue 4/4/2023, Normal         CONTINUE these medications which have NOT CHANGED    Details   acetaminophen (TYLENOL) 500 mg tablet Take 2 tablets (1,000 mg total) by mouth every 6 (six) hours as needed for mild pain, Starting Sun 1/16/2022, Print      budesonide (Pulmicort Flexhaler) 180 MCG/ACT inhaler Inhale 2 puffs 2 (two) times a day Rinse mouth after use , Starting Sun 1/16/2022, Print                 PDMP Review     None          ED Provider  Electronically Signed by           YANIV Sanderson  04/04/23 2003

## 2023-04-04 NOTE — ED NOTES
Patient made aware urine sample is needed  Patient reports she is unable to Provide at this time  Will attempt after receiving fluid bolus        Pardeep Krueger RN  04/04/23 3873

## 2023-04-04 NOTE — ED NOTES
Surgery at bedside     Familia Jim, [facility]  04/04/23 2904

## 2023-04-04 NOTE — DISCHARGE INSTRUCTIONS
Please follow up with General Surgery if pain persist  Please return if pain worsens  Follow up with you PCP for anemia  Please refer to handout for high fiber diet

## 2023-04-12 PROBLEM — K60.0 ACUTE ANAL FISSURE: Status: ACTIVE | Noted: 2023-04-12

## 2023-04-12 PROBLEM — M06.9 RHEUMATOID ARTHRITIS (HCC): Status: ACTIVE | Noted: 2023-04-12

## 2023-04-26 ENCOUNTER — TELEPHONE (OUTPATIENT)
Dept: SURGERY | Facility: CLINIC | Age: 38
End: 2023-04-26

## 2023-04-26 NOTE — TELEPHONE ENCOUNTER
Spoke with Dr Jolly Faith in regards to the prep for her procedure and he stated that she may do the [facility] and [facility] prep  Patient is aware and the colonoscopy bowel prep instructions were mailed to the patient

## 2023-04-26 NOTE — TELEPHONE ENCOUNTER
Patient called stating that her Suprep would cost her a $140 and wanted to know if there was another prep that she could take  I stated that I would talk to Dr Barb Pickard when he comes into the office around 10 to see if we can give her the MiraLAX and Dulcolax tablets prep  Patient understand and will await for a phone call

## 2023-05-04 ENCOUNTER — TELEPHONE (OUTPATIENT)
Dept: SURGERY | Facility: CLINIC | Age: 38
End: 2023-05-04

## 2023-05-04 NOTE — TELEPHONE ENCOUNTER
Left message for the patients grandmother Hood to have her granddaughter Alex Cadena call our office in regards to her insurance

## 2023-05-10 NOTE — TELEPHONE ENCOUNTER
Tried to contact the patient in regards her insurance and no voicemail was set up  I left a message for the patient's grandmother Filemon Barth as well to have [facility] call the office

## 2023-05-22 ENCOUNTER — TELEPHONE (OUTPATIENT)
Dept: SURGERY | Facility: CLINIC | Age: 38
End: 2023-05-22

## 2023-05-22 NOTE — TELEPHONE ENCOUNTER
Tried to contact the patient in regards we are canceling her surgery for tomorrow due we are having trouble getting the prior authorization  Several attempts were made and a message was left her grandmother Misti Chandlerjaimie as well to have the patient call ASAP  Also a my chart message was also sent to the patient

## 2023-05-22 NOTE — TELEPHONE ENCOUNTER
Spoke with Kerry to please contact Milly Alva to call our office in regards to her up coming surgery

## 2023-06-09 ENCOUNTER — TELEPHONE (OUTPATIENT)
Dept: SURGERY | Facility: CLINIC | Age: 38
End: 2023-06-09

## 2023-06-09 NOTE — TELEPHONE ENCOUNTER
----- Message from Selena Corona sent at 5/23/2023  1:28 PM EDT -----  Regarding: Pre op for surgery  Call patient regarding surgery

## 2023-06-09 NOTE — TELEPHONE ENCOUNTER
Called patient but was unable to leave voicemail due to mailbox not being set up   I called her grandmother and left voicemail stating this is a follow up call to see if she would like to reschedule her surgery and asked how she is feeling